# Patient Record
Sex: FEMALE | Race: WHITE | Employment: UNEMPLOYED | URBAN - METROPOLITAN AREA
[De-identification: names, ages, dates, MRNs, and addresses within clinical notes are randomized per-mention and may not be internally consistent; named-entity substitution may affect disease eponyms.]

---

## 2024-10-11 ENCOUNTER — APPOINTMENT (OUTPATIENT)
Dept: GENERAL RADIOLOGY | Age: 29
End: 2024-10-11
Payer: MEDICAID

## 2024-10-11 ENCOUNTER — HOSPITAL ENCOUNTER (EMERGENCY)
Age: 29
Discharge: HOME | End: 2024-10-11
Attending: EMERGENCY MEDICINE
Payer: MEDICAID

## 2024-10-11 VITALS
SYSTOLIC BLOOD PRESSURE: 107 MMHG | TEMPERATURE: 98 F | HEART RATE: 73 BPM | RESPIRATION RATE: 17 BRPM | WEIGHT: 114 LBS | HEIGHT: 60 IN | OXYGEN SATURATION: 98 % | DIASTOLIC BLOOD PRESSURE: 69 MMHG | BODY MASS INDEX: 22.38 KG/M2

## 2024-10-11 DIAGNOSIS — F41.1 ANXIETY STATE: Primary | ICD-10-CM

## 2024-10-11 DIAGNOSIS — R07.9 CHEST PAIN, UNSPECIFIED TYPE: ICD-10-CM

## 2024-10-11 LAB
ANION GAP SERPL CALCULATED.3IONS-SCNC: 17 MMOL/L (ref 9–16)
BASOPHILS # BLD: 0.03 K/UL (ref 0–0.2)
BASOPHILS NFR BLD: 0 % (ref 0–2)
BUN SERPL-MCNC: 9 MG/DL (ref 6–20)
CALCIUM SERPL-MCNC: 9.8 MG/DL (ref 8.6–10.4)
CHLORIDE SERPL-SCNC: 104 MMOL/L (ref 98–107)
CO2 SERPL-SCNC: 20 MMOL/L (ref 20–31)
CREAT SERPL-MCNC: 0.8 MG/DL (ref 0.5–0.9)
EOSINOPHIL # BLD: <0.03 K/UL (ref 0–0.44)
EOSINOPHILS RELATIVE PERCENT: 0 % (ref 1–4)
ERYTHROCYTE [DISTWIDTH] IN BLOOD BY AUTOMATED COUNT: 12.1 % (ref 11.8–14.4)
GFR, ESTIMATED: >90 ML/MIN/1.73M2
GLUCOSE SERPL-MCNC: 150 MG/DL (ref 74–99)
HCG SERPL QL: NEGATIVE
HCT VFR BLD AUTO: 46.9 % (ref 36.3–47.1)
HGB BLD-MCNC: 16.2 G/DL (ref 11.9–15.1)
IMM GRANULOCYTES # BLD AUTO: 0.04 K/UL (ref 0–0.3)
IMM GRANULOCYTES NFR BLD: 0 %
LYMPHOCYTES NFR BLD: 2.05 K/UL (ref 1.1–3.7)
LYMPHOCYTES RELATIVE PERCENT: 18 % (ref 24–43)
MCH RBC QN AUTO: 30.2 PG (ref 25.2–33.5)
MCHC RBC AUTO-ENTMCNC: 34.5 G/DL (ref 28.4–34.8)
MCV RBC AUTO: 87.5 FL (ref 82.6–102.9)
MONOCYTES NFR BLD: 0.4 K/UL (ref 0.1–1.2)
MONOCYTES NFR BLD: 4 % (ref 3–12)
NEUTROPHILS NFR BLD: 78 % (ref 36–65)
NEUTS SEG NFR BLD: 8.76 K/UL (ref 1.5–8.1)
NRBC BLD-RTO: 0 PER 100 WBC
PLATELET # BLD AUTO: 263 K/UL (ref 138–453)
PMV BLD AUTO: 10 FL (ref 8.1–13.5)
POTASSIUM SERPL-SCNC: 3.7 MMOL/L (ref 3.7–5.3)
RBC # BLD AUTO: 5.36 M/UL (ref 3.95–5.11)
SODIUM SERPL-SCNC: 141 MMOL/L (ref 136–145)
TROPONIN I SERPL HS-MCNC: <6 NG/L (ref 0–14)
TSH SERPL DL<=0.05 MIU/L-ACNC: 2.59 UIU/ML (ref 0.27–4.2)
WBC OTHER # BLD: 11.3 K/UL (ref 3.5–11.3)

## 2024-10-11 PROCEDURE — 80048 BASIC METABOLIC PNL TOTAL CA: CPT

## 2024-10-11 PROCEDURE — 93005 ELECTROCARDIOGRAM TRACING: CPT

## 2024-10-11 PROCEDURE — 84443 ASSAY THYROID STIM HORMONE: CPT

## 2024-10-11 PROCEDURE — 99285 EMERGENCY DEPT VISIT HI MDM: CPT

## 2024-10-11 PROCEDURE — 84703 CHORIONIC GONADOTROPIN ASSAY: CPT

## 2024-10-11 PROCEDURE — 71046 X-RAY EXAM CHEST 2 VIEWS: CPT

## 2024-10-11 PROCEDURE — 84484 ASSAY OF TROPONIN QUANT: CPT

## 2024-10-11 PROCEDURE — 85025 COMPLETE CBC W/AUTO DIFF WBC: CPT

## 2024-10-11 ASSESSMENT — ENCOUNTER SYMPTOMS
VOMITING: 0
SHORTNESS OF BREATH: 0
ABDOMINAL PAIN: 0
NAUSEA: 0

## 2024-10-11 ASSESSMENT — PAIN SCALES - GENERAL: PAINLEVEL_OUTOF10: 2

## 2024-10-11 ASSESSMENT — PAIN - FUNCTIONAL ASSESSMENT: PAIN_FUNCTIONAL_ASSESSMENT: 0-10

## 2024-10-11 NOTE — ED NOTES
Pt to ed with friend from home.   Pt states she has been having symptoms intermittently for the past several months. Pt states symptoms worsened today when she woke up.   Pt c/o chest heaviness and palpitation, pt is highly anxious and suffers from anxiety. Pt c/o left arm numbness, no weakness, right eye pupil abnormalities. Pt states she was evaluated by ophthalmology due to unequal pupils. Pt is alert, oriented speaking in full, complete sentences, no distress noted.   Pt rates pain 8/10. Pt states it is heaviness, pressure in her chest.

## 2024-10-11 NOTE — ED PROVIDER NOTES
[facility]     Emergency Department     Faculty Attestation    I performed a history and physical examination of the patient and discussed management with the resident. I reviewed the resident’s note and agree with the documented findings and plan of care. Any areas of disagreement are noted on the chart. I was personally present for the key portions of any procedures. I have documented in the chart those procedures where I was not present during the key portions. I have reviewed the emergency nurses triage note. I agree with the chief complaint, past medical history, past surgical history, allergies, medications, social and family history as documented unless otherwise noted below. Documentation of the HPI, Physical Exam and Medical Decision Making performed by medical students or scribes is based on my personal performance of the HPI, PE and MDM. For Physician Assistant/ Nurse Practitioner cases/documentation I have personally evaluated this patient and have completed at least one if not all key elements of the E/M (history, physical exam, and MDM). Additional findings are as noted.    Vital signs:   Vitals:    10/11/24 1133   BP: 137/88   Pulse: 84   Resp: 15   Temp:    SpO2: 98%      29-year-old female here with intermittent palpitations, left arm numbness, left face tightness and right pupillary changes since the beginning of the summer. She states these symptoms have been progressively worsening and causing anxiety. No headache. No blurred vision. No lateralizing weakness. She has seen an eye doctor for the pupil changes and he said everything looked normal. She only notices the pupil changes when she bends over. The patient has no prior history of venous thromboembolism. No recent travel. No recent surgery. No leg swelling. No hemoptysis. No estrogen containing medications. No history of malignancy. No known history of heart disease. She was previously taking some herbal medication

## 2024-10-11 NOTE — DISCHARGE INSTRUCTIONS
You were seen for concerns of palpitations, chest pain that you relate to anxiety.  Follow-up with your PCP.    You are also concerned for changes in your right pupil, without headache, weakness, vision changes.  You can follow-up with neurology if you would like.    Return to the emergency department for worsening of symptoms, including chest pain, shortness of breath, fever, chills not relieved by Tylenol or ibuprofen, any other concern.

## 2024-10-11 NOTE — ED PROVIDER NOTES
[facility] ED  Emergency Department Encounter  Emergency Medicine Resident     Pt Name:Bessy Huizar  MRN: [id number]  Birthdate 1995  Date of evaluation: 10/11/24  PCP:  Paul Adler MD  Note Started: 11:24 AM EDT      CHIEF COMPLAINT       Chief Complaint   Patient presents with    Chest Pain    Numbness    Eye Problem       HISTORY OF PRESENT ILLNESS  (Location/Symptom, Timing/Onset, Context/Setting, Quality, Duration, Modifying Factors, Severity.)      Bessy Huizar is a 29 y.o. female who presents with palpitations, chest discomfort, anxiety associated with left arm numbness, left face tightness and right pupil abnormality.  Patient reports intermittent episodes since beginning of summer.  Reports that these episodes have been more frequent and started happening every day and getting worse.  This particular episode started when she woke up.  Denies recent illness, fever, chills, shortness of breath.  Did take a herbal supplement for anxiety for a week a couple of weeks ago which seem to help.  Not on any medications.  No history of DVT.    PAST MEDICAL / SURGICAL / SOCIAL / FAMILY HISTORY      has no past medical history on file.     has no past surgical history on file.    Social History     Socioeconomic History    Marital status: Single     Spouse name: Not on file    Number of children: Not on file    Years of education: Not on file    Highest education level: Not on file   Occupational History    Not on file   Tobacco Use    Smoking status: Every Day    Smokeless tobacco: Not on file   Substance and Sexual Activity    Alcohol use: No    Drug use: Yes     Types: Marijuana (Weed)    Sexual activity: Yes     Partners: Male   Other Topics Concern    Not on file   Social History Narrative    Not on file     Social Determinants of Health     Financial Resource Strain: Not on file   Food Insecurity: Not on file   Transportation Needs: Not on file   Physical Activity: Not

## 2024-10-12 LAB
EKG ATRIAL RATE: 135 BPM
EKG P AXIS: 72 DEGREES
EKG P-R INTERVAL: 150 MS
EKG Q-T INTERVAL: 274 MS
EKG QRS DURATION: 68 MS
EKG QTC CALCULATION (BAZETT): 411 MS
EKG R AXIS: -14 DEGREES
EKG T AXIS: 69 DEGREES
EKG VENTRICULAR RATE: 135 BPM

## 2024-10-12 PROCEDURE — 93010 ELECTROCARDIOGRAM REPORT: CPT | Performed by: INTERNAL MEDICINE

## 2024-11-13 ENCOUNTER — TELEPHONE (OUTPATIENT)
Dept: NEUROLOGY | Age: 29
End: 2024-11-13

## 2024-11-13 NOTE — TELEPHONE ENCOUNTER
Pt called into office to schedule a new patient appointment and reported they need to be seen, but recently lost insurance due to being  and income is too high now.     SW asked if their spouse has insurance through their work as they can be added on as marriage is considers a life changing event.Patient reports that they can not go onto their spouse's insurance due to not being able to afford the cost.     Patient can [employer] and call any marketplace insurance company to obtain marketplace insurance, but tends to be more costly than going onto a spouse's insurance  through their company.       SW talked about [facility] patient assistance  program that they can apply for that can reduce the cost of a visit. Pt can apply through the billing department or mailing in the one page paper.   SW encouraged patient to call the [facility] Billing department to see if pt comes to appointment what their financial obligation is at the time of the visit if have no insurance and/ or qualifies for partial or whole assistance.     Pt was transferred to scheduling line to be scheduled and aware that if after talking to the billing department they do not wish to be seen they can call back into the office to cancel.

## 2025-01-21 ENCOUNTER — OFFICE VISIT (OUTPATIENT)
Dept: NEUROLOGY | Age: 30
End: 2025-01-21

## 2025-01-21 VITALS
HEART RATE: 62 BPM | DIASTOLIC BLOOD PRESSURE: 73 MMHG | SYSTOLIC BLOOD PRESSURE: 132 MMHG | WEIGHT: 115 LBS | HEIGHT: 60 IN | BODY MASS INDEX: 22.58 KG/M2

## 2025-01-21 DIAGNOSIS — H57.02 PHYSIOLOGIC ANISOCORIA: Primary | ICD-10-CM

## 2025-01-21 PROCEDURE — 99205 OFFICE O/P NEW HI 60 MIN: CPT | Performed by: STUDENT IN AN ORGANIZED HEALTH CARE EDUCATION/TRAINING PROGRAM

## 2025-01-21 NOTE — PROGRESS NOTES
Initial Neurology Clinic Note    [facility]  Department of Neurology  Date of Service: 1/21/2025 at 1:53 PM      CLINIC NOTE - INITIAL VISIT    Bessy Huizar is a 29 y.o. right handed female who came to establish care for unequal pupils.    Patient was accompanied by her significant other.    PRESENTING ILLNESS:      In July, 2024 patient noticed that her right pupil was bigger than the left one.  Patient noticed that there was always 1 to 2 mm difference in size between the tube pupils which were more pronounced when she was anxious about something.  She was evaluated in ER.  CT head was done which was unremarkable.  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  0.1% pilocarpine test was normal (no constriction in both pupils).  She has anxiety and panic attacks and usually gets right eye twitching with panic attacks.  Patient reports that she noticed that her pupils were unequal and one of her old pictures from 2019 with right being the bigger one.  Patient denies any trauma to the eye, headaches, visual field cut/deficits, weakness/numbness and tingling in face arms or legs, neck pain, use of any inhalers, eyedrops or patches.  She denies any diplopia, ptosis, speech changes and difficulty swallowing food.    PREVIOUS EVALUATION:    11/14/2024:  CT BRAIN WO CONT     CT images of the brain were obtained and compared to prior exam dated February 22, 2003.  There is no acute intracranial hemorrhage or infarct.  No mass is seen.  Sagittal reformatted images show no abnormal sellar or suprasellar abnormality.  No evidence of vasogenic edema.  No sulcal or cisternal   effacement.  Orbits appear symmetric.  No osseous abnormality seen.     IMPRESSION:     No acute intracranial findings     CTA head and neck:  FINDINGS: No comparisons available.  Patent intracranial carotid arteries.  Patent anterior, middle, and posterior cerebral arteries